# Patient Record
Sex: FEMALE | Race: WHITE | Employment: FULL TIME
[De-identification: names, ages, dates, MRNs, and addresses within clinical notes are randomized per-mention and may not be internally consistent; named-entity substitution may affect disease eponyms.]

---

## 2023-10-05 ENCOUNTER — NURSE TRIAGE (OUTPATIENT)
Dept: OTHER | Facility: CLINIC | Age: 33
End: 2023-10-05

## 2023-10-05 NOTE — TELEPHONE ENCOUNTER
Location of patient: [place]    Received call from [facility] [place] at [facility] with [employer]. Subjective: Caller states \"sob , and dry cough \"     Current Symptoms: has a cough and feels sob with bending her neck to chest , neck does seem swollen possible enlarged thyroid occas coughing dry and choking like feeling at times and dif swallowing , no n/v/d , covid test is negative , fatigue also ,     Pt is un established     Onset: 3 weeks     Associated Symptoms: NA    Pain Severity: none    Temperature: none       What has been tried: humidifier    LMP:  sept  Pregnant: No    Recommended disposition: See PCP within 3 Days    Care advice provided, patient verbalizes understanding; denies any other questions or concerns; instructed to call back for any new or worsening symptoms. Patient/Caller agrees with recommended disposition; writer provided warm transfer to [facility] for appointment scheduling    Attention Provider: Thank you for allowing me to participate in the care of your patient. The patient was connected to triage in response to information provided to the ECC/PSC. Please do not respond through this encounter as the response is not directed to a shared pool.          Reason for Disposition   MODERATE longstanding difficulty breathing (e.g., speaks in phrases, SOB even at rest, pulse 100-120) and SAME as normal    Protocols used: Breathing Difficulty-ADULT-[place]

## 2023-10-06 ENCOUNTER — OFFICE VISIT (OUTPATIENT)
Dept: PRIMARY CARE CLINIC | Age: 33
End: 2023-10-06

## 2023-10-06 VITALS
WEIGHT: 293 LBS | HEART RATE: 92 BPM | BODY MASS INDEX: 45.99 KG/M2 | TEMPERATURE: 97.6 F | DIASTOLIC BLOOD PRESSURE: 85 MMHG | SYSTOLIC BLOOD PRESSURE: 127 MMHG | RESPIRATION RATE: 16 BRPM | HEIGHT: 67 IN | OXYGEN SATURATION: 98 %

## 2023-10-06 DIAGNOSIS — Z00.00 BLOOD TESTS FOR ROUTINE GENERAL PHYSICAL EXAMINATION: ICD-10-CM

## 2023-10-06 DIAGNOSIS — R06.02 SOB (SHORTNESS OF BREATH): ICD-10-CM

## 2023-10-06 DIAGNOSIS — R05.1 ACUTE COUGH: Primary | ICD-10-CM

## 2023-10-06 DIAGNOSIS — L30.9 ECZEMA, UNSPECIFIED TYPE: ICD-10-CM

## 2023-10-06 DIAGNOSIS — R05.1 ACUTE COUGH: ICD-10-CM

## 2023-10-06 DIAGNOSIS — Z76.89 ESTABLISHING CARE WITH NEW DOCTOR, ENCOUNTER FOR: ICD-10-CM

## 2023-10-06 LAB
ALBUMIN SERPL-MCNC: 4.5 G/DL (ref 3.5–5.2)
ALP BLD-CCNC: 74 U/L (ref 35–104)
ALT SERPL-CCNC: 14 U/L (ref 0–32)
ANION GAP SERPL CALCULATED.3IONS-SCNC: 16 MMOL/L (ref 7–16)
AST SERPL-CCNC: 18 U/L (ref 0–31)
BILIRUB SERPL-MCNC: 0.3 MG/DL (ref 0–1.2)
BUN BLDV-MCNC: 8 MG/DL (ref 6–20)
CALCIUM SERPL-MCNC: 9.3 MG/DL (ref 8.6–10.2)
CHLORIDE BLD-SCNC: 104 MMOL/L (ref 98–107)
CO2: 21 MMOL/L (ref 22–29)
CREAT SERPL-MCNC: 0.8 MG/DL (ref 0.5–1)
GFR SERPL CREATININE-BSD FRML MDRD: >60 ML/MIN/1.73M2
GLUCOSE BLD-MCNC: 87 MG/DL (ref 74–99)
HCT VFR BLD CALC: 40 % (ref 34–48)
HEMOGLOBIN: 12.2 G/DL (ref 11.5–15.5)
MCH RBC QN AUTO: 24.8 PG (ref 26–35)
MCHC RBC AUTO-ENTMCNC: 30.5 G/DL (ref 32–34.5)
MCV RBC AUTO: 81.3 FL (ref 80–99.9)
PDW BLD-RTO: 16.7 % (ref 11.5–15)
PLATELET # BLD: 218 K/UL (ref 130–450)
PMV BLD AUTO: 10.9 FL (ref 7–12)
POTASSIUM SERPL-SCNC: 4.4 MMOL/L (ref 3.5–5)
RBC # BLD: 4.92 M/UL (ref 3.5–5.5)
SODIUM BLD-SCNC: 141 MMOL/L (ref 132–146)
TOTAL PROTEIN: 7.7 G/DL (ref 6.4–8.3)
TSH SERPL DL<=0.05 MIU/L-ACNC: 1.23 UIU/ML (ref 0.27–4.2)
WBC # BLD: 6.4 K/UL (ref 4.5–11.5)

## 2023-10-06 RX ORDER — CLONIDINE HYDROCHLORIDE 0.1 MG/1
0.1 TABLET ORAL DAILY
COMMUNITY

## 2023-10-06 RX ORDER — CITALOPRAM HYDROBROMIDE 10 MG/1
10 TABLET ORAL DAILY
COMMUNITY
Start: 2023-09-01

## 2023-10-06 RX ORDER — PREDNISONE 50 MG/1
TABLET ORAL
COMMUNITY
Start: 2023-10-05 | End: 2023-10-06

## 2023-10-06 RX ORDER — TRIAMCINOLONE ACETONIDE 1 MG/G
OINTMENT TOPICAL 2 TIMES DAILY
Qty: 30 G | Refills: 0 | Status: SHIPPED | OUTPATIENT
Start: 2023-10-06 | End: 2023-10-13

## 2023-10-06 RX ORDER — DEXTROAMPHETAMINE SACCHARATE, AMPHETAMINE ASPARTATE, DEXTROAMPHETAMINE SULFATE AND AMPHETAMINE SULFATE 2.5; 2.5; 2.5; 2.5 MG/1; MG/1; MG/1; MG/1
TABLET ORAL
COMMUNITY
Start: 2023-09-12

## 2023-10-06 RX ORDER — DEXAMETHASONE 4 MG/1
TABLET ORAL
COMMUNITY
Start: 2023-10-05 | End: 2023-10-06

## 2023-10-06 RX ORDER — PSEUDOEPHEDRINE HCL 30 MG/1
TABLET ORAL
COMMUNITY
Start: 2023-10-05 | End: 2023-10-06

## 2023-10-06 RX ORDER — FLUTICASONE PROPIONATE 50 MCG
1 SPRAY, SUSPENSION (ML) NASAL DAILY
Qty: 32 G | Refills: 0 | Status: SHIPPED | OUTPATIENT
Start: 2023-10-06

## 2023-10-06 RX ORDER — CETIRIZINE HYDROCHLORIDE 10 MG/1
10 TABLET ORAL DAILY
Qty: 30 TABLET | Refills: 0 | Status: SHIPPED | OUTPATIENT
Start: 2023-10-06 | End: 2023-11-05

## 2023-10-06 RX ORDER — DEXTROAMPHETAMINE SACCHARATE, AMPHETAMINE ASPARTATE, DEXTROAMPHETAMINE SULFATE AND AMPHETAMINE SULFATE 5; 5; 5; 5 MG/1; MG/1; MG/1; MG/1
TABLET ORAL
COMMUNITY
Start: 2023-09-12

## 2023-10-06 SDOH — ECONOMIC STABILITY: HOUSING INSECURITY
IN THE LAST 12 MONTHS, WAS THERE A TIME WHEN YOU DID NOT HAVE A STEADY PLACE TO SLEEP OR SLEPT IN A SHELTER (INCLUDING NOW)?: NO

## 2023-10-06 SDOH — ECONOMIC STABILITY: FOOD INSECURITY: WITHIN THE PAST 12 MONTHS, THE FOOD YOU BOUGHT JUST DIDN'T LAST AND YOU DIDN'T HAVE MONEY TO GET MORE.: NEVER TRUE

## 2023-10-06 SDOH — ECONOMIC STABILITY: INCOME INSECURITY: HOW HARD IS IT FOR YOU TO PAY FOR THE VERY BASICS LIKE FOOD, HOUSING, MEDICAL CARE, AND HEATING?: NOT HARD AT ALL

## 2023-10-06 SDOH — ECONOMIC STABILITY: FOOD INSECURITY: WITHIN THE PAST 12 MONTHS, YOU WORRIED THAT YOUR FOOD WOULD RUN OUT BEFORE YOU GOT MONEY TO BUY MORE.: NEVER TRUE

## 2023-10-06 ASSESSMENT — PATIENT HEALTH QUESTIONNAIRE - PHQ9
SUM OF ALL RESPONSES TO PHQ QUESTIONS 1-9: 0
2. FEELING DOWN, DEPRESSED OR HOPELESS: 0
SUM OF ALL RESPONSES TO PHQ QUESTIONS 1-9: 0
SUM OF ALL RESPONSES TO PHQ QUESTIONS 1-9: 0
SUM OF ALL RESPONSES TO PHQ9 QUESTIONS 1 & 2: 0
SUM OF ALL RESPONSES TO PHQ QUESTIONS 1-9: 0
1. LITTLE INTEREST OR PLEASURE IN DOING THINGS: 0

## 2023-10-06 NOTE — PROGRESS NOTES
[facility]  Family Medicine      Patient:  Jeff Rojas 28 y.o. female  Date of Service: 10/6/23      Chief complaint:   Chief Complaint   Patient presents with    New Patient     Establish care     Cough     Dry x2weeks     Thyroid Problem     Would like thyroid checked     Rash     Itchy rash on rt foot          History of Present Illness   Jeff Rojas is a 28 y.o. female who presents to the clinic with complaints as above.     Establish Care with New Physician  Medical History discussed and updated in chart  Surgical History discussed and updated in chart  Social History discussed and updated in chart  Medications reviewed and updated in chart as appropriate  See below for Acute/Chronic conditions addressed today     Cough  For 2 weeks, hoarse voice for a few weeks longer  Has tried humidifier without relief  Denies sick contacts, tested negative for COVID at home  Becomes SOB with activity, looking down  Non productive cough  Feels the front of her neck is enlarged  Denies fever, chills, chest pain, shortness of breath, abdominal pain, nausea, vomiting, diarrhea, numbness, tingling, loss of bowel or bladder control     Rash  Top and lateral side of right foot with about a year of itchy red rash  Family hx psoriasis, personal history of widespread eczema  This has not responded to steroids, emollients so far  Worse with spring, got better in winter time  Denies new detergents, shoes, materials  Rash has not spread recently    Current Health Maintenance:  Health Maintenance   Topic Date Due    Hepatitis B vaccine (1 of 3 - 3-dose series) Never done    COVID-19 Vaccine (1) Never done    Varicella vaccine (1 of 2 - 2-dose childhood series) Never done    Depression Screen  Never done    HIV screen  Never done    Hepatitis C screen  Never done    DTaP/Tdap/Td vaccine (1 - Tdap) Never done    Cervical cancer screen  Never done    Flu vaccine (1) Never done    Hepatitis A vaccine  Aged Out    Hib